# Patient Record
Sex: FEMALE | Race: WHITE
[De-identification: names, ages, dates, MRNs, and addresses within clinical notes are randomized per-mention and may not be internally consistent; named-entity substitution may affect disease eponyms.]

---

## 2018-02-13 ENCOUNTER — HOSPITAL ENCOUNTER (OUTPATIENT)
Dept: HOSPITAL 46 - DS | Age: 63
Discharge: HOME | End: 2018-02-13
Attending: SURGERY
Payer: COMMERCIAL

## 2018-02-13 VITALS — BODY MASS INDEX: 36.07 KG/M2 | HEIGHT: 62 IN | WEIGHT: 195.99 LBS

## 2018-02-13 DIAGNOSIS — E78.00: ICD-10-CM

## 2018-02-13 DIAGNOSIS — Z79.82: ICD-10-CM

## 2018-02-13 DIAGNOSIS — E11.9: ICD-10-CM

## 2018-02-13 DIAGNOSIS — C77.3: ICD-10-CM

## 2018-02-13 DIAGNOSIS — Z88.5: ICD-10-CM

## 2018-02-13 DIAGNOSIS — C50.112: ICD-10-CM

## 2018-02-13 DIAGNOSIS — Z98.890: ICD-10-CM

## 2018-02-13 DIAGNOSIS — E05.90: ICD-10-CM

## 2018-02-13 DIAGNOSIS — E78.5: ICD-10-CM

## 2018-02-13 DIAGNOSIS — C50.512: Primary | ICD-10-CM

## 2018-02-13 DIAGNOSIS — Z79.899: ICD-10-CM

## 2018-02-13 DIAGNOSIS — Z17.0: ICD-10-CM

## 2018-02-13 DIAGNOSIS — Z90.49: ICD-10-CM

## 2018-02-13 DIAGNOSIS — Z91.018: ICD-10-CM

## 2018-02-13 PROCEDURE — A9541 TC99M SULFUR COLLOID: HCPCS

## 2018-02-13 PROCEDURE — 0HBU0ZZ EXCISION OF LEFT BREAST, OPEN APPROACH: ICD-10-PCS | Performed by: SURGERY

## 2018-02-13 PROCEDURE — 07B60ZX EXCISION OF LEFT AXILLARY LYMPHATIC, OPEN APPROACH, DIAGNOSTIC: ICD-10-PCS | Performed by: SURGERY

## 2018-02-13 NOTE — NUR
PT IS BACK TO DS FROM PACU. PT IS AWAKE AND ALERT, FAMILY IS AT THE BEDSIDE.
PT HAS WATER ON THE BEDSIDE TABLE. SHE HAS NO C/O'S PAIN OR NAUSEA AT THIS
TIME. CALL LIGHT WITHIN REACH. NO OTHER C/O'S AT THIS TIME. WILL REASSES
WITHIN THE HOUR.

## 2018-02-13 NOTE — NUR
PT AND FAMILY EDUCATED ON HOW TO CARE FOR BRIAN DRAIN. DC INSTRUCTIONS VERBALLY
GIVEN, QUESTIONS ASKED THROUGHOUT THE DC PROCESS.

## 2018-02-13 NOTE — NUR
PT REPORTS HER PAIN IS IMPROVING AFTER PAIN MEDICATION. SHE WOULD LIKE TO GET
UP AND GO TO THE BATHROOM.

## 2018-02-13 NOTE — NUR
PT RETURNS FROM IMAGING AND IV IS RECONNECTED. FAMILY REMAINS @ BS. PT
AMBULATES TO THE BR AND IS BACK IN BED. PT DENIES ADD'L NEEDS.

## 2018-02-14 NOTE — OR
[facility]
                                    [address], [place]  [address]
_________________________________________________________________________________________
                                                                 Signed   
 
 
DATE OF OPERATION:
02/13/2018
 
SURGEON:
Devin Cherry MD
 
PREOPERATIVE DIAGNOSES:
1. Left central upper infiltrating ductal breast carcinoma.  ER positive, MD negative,
HER-2/jacque pending. 
2. Poor radionuclide uptake to axilla on sentinel lymph node identification.
 
POSTOPERATIVE DIAGNOSES:
1. Left central upper infiltrating ductal breast carcinoma.  ER positive, MD negative,
HER-2/jacque pending. 
2. Poor radionuclide uptake to axilla on sentinel lymph node identification.
3. Uptake of methylene blue dye to axillary lymph node suspicious for metastatic disease.
 
PROCEDURES:
1. Injection of methylene blue dye for sentinel lymph node identification.
2. Left axillary sentinel lymph node biopsy.
3. Left axillary lymph node dissection with placement of drain.
4. Excision of left breast carcinoma, central upper breast with additional resection of
suspicious tissue in the left lower lateral aspect. 
5. Oncoplastic closure with mobilization of breast parenchyma to close defect and
excision of skin for cosmetic benefit. 
 
ANESTHESIA:
General endotracheal, Yung Gunderson CRNA.
 
INDICATION:
This is a 62-year-old white woman, [facility] and identified as
having a palpable mass in the upper left breast a few cm from the areolar margin.
Image-guided biopsy by Dr. Loyd confirmed infiltrating ductal carcinoma, ER positive,
MD negative, HER-2/jacque pending.  The mass is easily palpable and despite a fair amount
of ecchymosis from her biopsy measured clinically about 2 cm, possibly larger.  There is
no other regional palpable mass bladder exam.  She does have morbid obesity.  The left
axilla is clinically negative. 
 
She is admitted at this time to undergo partial mastectomy, sentinel lymph node biopsy,
anticipating radiation therapy, as well as other indicated therapies depending on
pathologic findings. 
 
 
    Electronically Signed By: DEVIN CHERRY MD  02/14/18 1041
_________________________________________________________________________________________
PATIENT NAME:     AMARJIT RENEE                       
MEDICAL RECORD #: [id number]                     OPERATIVE REPORT              
          ACCT #: [id number]  
DATE OF BIRTH:   [birth year]                                       
PHYSICIAN:   DEVIN CHERRY MD                      REPORT #: [id number]
REPORT IS CONFIDENTIAL AND NOT TO BE RELEASED WITHOUT AUTHORIZATION
 
 
                                  [facility]
                                    [address], [place]  [address]
_________________________________________________________________________________________
                                                                 Signed   
 
 
She understands the risks of bleeding, infection, arm edema, need for additional therapy
including additional resection of breast tissue, additional axillary surgery or even
mastectomy depending on findings and wishes to proceed. 
 
FINDINGS:
Radionuclide identification of axillary lymph node was not accomplished though
attempted.  Good injection and uptake in the surrounding parenchyma of the breast were
noted, but no uptake to the axilla appreciably.  Methylene blue dye injected in the
operating room setting showed arborization of the lymphatics in the areolar area, but no
generalized arborization, but a left axillary lymph node was identified as having high
uptake of methylene blue dye and a relatively large palpable node.  Frozen pathology by
Dr. Silva and his colleagues showed the lymph node to be suspicious for metastatic
disease, but not certain.  Palpation of the axilla in addition to this frozen pathology
prompted complete axillary dissection as the lymph nodes were suspicious for metastatic
disease.  Long thoracic and thoracodorsal neurovascular bundles were identified and
preserved. 
 
As regard to the tumor itself, it was relatively large.  It was located a few
centimeters cephalad to the areolar margin.  A circumareolar incision was made and
meticulous care made to maintain a negative margin in all areas, which resulted in a
relatively large defect.  Parenchymal mobilization to close the defect was accomplished
for its oncoplastic purpose.  Additional skin was resected to this and as well.  The
resected skin was directly over the area of the tumor and was additionally sent as
specimen.  Of note, another completely different focus of very suspicious breast
parenchyma in the inferior lateral aspect was noted, which would make it essentially in
a different quadrant.  This was fully excised and we await final pathology to know it is
true identity, but I am very suspicious it may represent a separate focus of breast
cancer that was not identified on imaging studies or on clinical exam. 
 
DESCRIPTION OF PROCEDURE:
The patient was brought to the operating room having been received from the Radiology
suite for attempted localization with radionuclide.  I reviewed the films with Dr. Loyd and there was no obvious uptake in the axilla on imaging studies.  In the supine
position, she was given a general endotracheal anesthetic and preoperative antibiotic
Ancef as well as sequential compression device stockings and heparin subcutaneously
administered.  Using the C-Trak radionuclide probe, obvious strong signal was noted in
the circumareolar area, but no appreciable uptake in the axilla itself.  There was a
small amount in the parenchyma laterally, but not much.  Nevertheless, methylene blue
dye was injected in the left periareolar area in the upper outer aspect anticipating an
axillary incision and probable axillary dissection.  Palpation of the axilla did show
some suspicious palpable adenopathy high in the axilla, not previously known. 
 
 
    Electronically Signed By: DEVIN CHERRY MD  02/14/18 1041
_________________________________________________________________________________________
PATIENT NAME:     AMARJIT RENEE                       
MEDICAL RECORD #: [id number]                     OPERATIVE REPORT              
          ACCT #: [id number]  
DATE OF BIRTH:   [birth year]                                       
PHYSICIAN:   DEVIN CHERRY MD                      REPORT #: [id number]
REPORT IS CONFIDENTIAL AND NOT TO BE RELEASED WITHOUT AUTHORIZATION
 
 
                                  [address]  [address]
_________________________________________________________________________________________
                                                                 Signed   
 
 
The breast was prepared with chlorhexidine solution and draped sterilely.  An incision
was made transversely in the upper axilla.  Dissection carried through the dermis with
electrocautery and blunt dissection allowing for interrogation of the fatty left
axillary contents.  Palpation deep within the axilla did show several suspicious large
very firm lymph nodes.  Dissection on the medial wall of the cavity showed a palpable
lymph node that had good uptake of methylene blue dye.  This was dissected free and
considered sentinel lymph node.  Application of the C-Trak probe to it did not show
appreciable radioactivity, however.  Specimens considered a sentinel lymph node
nevertheless and examined by the pathologist, Dr. Tony Silva.  Though we could not say
definitively that there was metastatic disease within the lymph node, he was suspicious
and given the clinical circumstances I certainly highly suspicious that it did represent
metastatic disease.  On that basis, completion axillary dissection was considered
appropriate.  Using sharp dissection, the axillary vein was identified in the soft
tissue inferior to it, was dissected free away from the axillary vein.  Clips were
applied to small vessels as necessary and intercostal brachial vessels which could not
be avoided and their division and dissection also secured.  The long thoracic and
thoracodorsal neurovascular bundles were identified and preserved.  The axillary packet
was ultimately freed from a superior to inferior and medial direction excising a
generous packet of lymph nodes.  The tissue was  with a hemostat and another
small sentinel lymph node was identified and marked with a suture for further attention
on permanent examination.  The wound was irrigated with sterile water, showing no sign
of untoward bleeding.  The axilla was packed with a plain gauze and attention turned
towards the primary lesion. 
 
The palpable lesion was a few centimeters cephalad to the areolar margin.  The areolar
margin was marked carefully and incised a 1 cm margin from the palpable neoplasm
cephalad and its orientation was taken directly down to the essentially the chest wall
providing an inferior margin that was clinically quite clear.  Subsequently, a flap was
elevated cephalad directly over the tumor maintaining a clinically negative margin.
Dissection laterally and medially was then undertaken, excising a very generous segment
of breast tissue, certainly a partial mastectomy.  The specimen was marked with a single
stitch in the inferior margin.  A double stitch in the superficial margin and 3 silk
sutures laterally.  Notably in that area was a small nodule completely separate from the
index lesion, which was suspicious for either an intraparenchymal lymph node with
metastatic disease or perhaps a 2nd lesion.  Palpation within the resected cavity
laterally showed a very dense lesion.  This was inferior and lateral.  This was grasped
with an Allis clamp and excised with electrocautery with a wide margin and is very
suspicious for additional focus of malignancy.  This is not certain, however, and final
pathology is pending. 
 
At this point, the defect of the resected site was rather considerable.  Parenchymal
mobilization for a more oncoplastic closure was deemed advisable and readily possible.
 
    Electronically Signed By: DEVIN CHERRY MD  02/14/18 1041
_________________________________________________________________________________________
PATIENT NAME:     AMARJIT RENEE                       
MEDICAL RECORD #: [id number]                     OPERATIVE REPORT              
          ACCT #: [id number]  
DATE OF BIRTH:   [birth year]                                       
PHYSICIAN:   DEVIN CHERRY MD                      REPORT #: [id number]
REPORT IS CONFIDENTIAL AND NOT TO BE RELEASED WITHOUT AUTHORIZATION
 
 
                                  [address]  [address]
_________________________________________________________________________________________
                                                                 Signed   
 
 
A flap was elevated cephalad freeing the overlying skin and subcutaneous tissue from the
underlying breast parenchyma.  Mobilization of this pedicle superiorly to the inferior
pedicle beneath the nipple was undertaken and secured with interrupted 2-0 Vicryl
suture.  This allowed for good reapproximation of parenchymal remnants.  The deep dermis
was reapproximated with interrupted 2-0 Vicryl and redundant dermis was quite obvious
cephalad and resected.  That resected skin would correspond to being directly over the
neoplasm that has previously been resected.  Additional reapproximation of dermis
throughout the length of the incision closed the wound down quite markedly.  Tethering
of the dermis to underlying parenchyma was still present to a small degree, but left in
situ, so as to not excessively mobilize the superior flap. 
 
The skin was then closed with interrupted 3-0 Vicryl and a running subcuticular 3-0
Vicryl ultimately performed.  Steri-Strips were applied as was a Mepilex silver sponge
dressing and an OpSite.  Reinspection of the axilla showed good hemostasis.  Irrigation
was undertaken showing no bleeding.  Through a separate stab incision, a 7 mm flat Shan
drain was placed and secured to the skin with nylon suture.  The wound was closed in
layers with interrupted 2-0 Vicryl and running subcuticular 3-0 Vicryl.  Steri-Strips
were applied as well as a Mepilex silver sponge dressing and an OpSite. 
 
The blood loss was approximately 50 mL in aggregate.  Most of it related to the breast
parenchymal dissection.  The operation was somewhat prolonged, complicated, and
difficult on the basis of need for oncoplastic closure technique. 
 
 
 
            ________________________________________
            MD MI Queen/EMILY
Job #:  [id number]
DD:  02/13/2018 14:25:35
DT:  02/13/2018 22:24:17
 
cc:            LAUREN Miller MD
 
 
 
 
 
 
    Electronically Signed By: DEVIN CHERRY MD  02/14/18 1041
_________________________________________________________________________________________
PATIENT NAME:     AMARJIT RENEE                       
MEDICAL RECORD #: [id number]                     OPERATIVE REPORT              
          ACCT #: [id number]  
DATE OF BIRTH:   [birth year]                                       
PHYSICIAN:   DEVIN CHERRY MD                      REPORT #: [id number]
REPORT IS CONFIDENTIAL AND NOT TO BE RELEASED WITHOUT AUTHORIZATION

## 2018-02-22 ENCOUNTER — HOSPITAL ENCOUNTER (INPATIENT)
Dept: HOSPITAL 46 - DS | Age: 63
LOS: 1 days | Discharge: HOME | DRG: 582 | End: 2018-02-23
Attending: SURGERY | Admitting: SURGERY
Payer: COMMERCIAL

## 2018-02-22 VITALS — HEIGHT: 62 IN | WEIGHT: 195.99 LBS | BODY MASS INDEX: 36.07 KG/M2

## 2018-02-22 DIAGNOSIS — C50.812: Primary | ICD-10-CM

## 2018-02-22 DIAGNOSIS — C77.3: ICD-10-CM

## 2018-02-22 DIAGNOSIS — Z17.1: ICD-10-CM

## 2018-02-22 PROCEDURE — 0KXG0Z8 TRANSFER LEFT TRUNK MUSCLE, SUPERFICIAL INFERIOR EPIGASTRIC ARTERY FLAP, OPEN APPROACH: ICD-10-PCS | Performed by: SURGERY

## 2018-02-22 PROCEDURE — 0HTU0ZZ RESECTION OF LEFT BREAST, OPEN APPROACH: ICD-10-PCS | Performed by: SURGERY

## 2018-02-22 NOTE — NUR
STEADY ON FEET, UP TO BATHROOM WITH STANDBY ASSIST, HAS 2 BRIAN DRAINS WITH LARGE
CLOTS PRESENT IN THE BULBS. DRESSING IS C/D/I, LEFT ARM RESTRICTED EXTREMETY.
PAIN CONTROLLED WITH DILAUDID. EATING AND DRINKING WELL.

## 2018-02-22 NOTE — NUR
TO ROOM 107 FROM PACU, BROUGHT ON STRETCHER WITH PACU RN. REPORT RECIEVED FROM
CHRISTIAN RN PACU NURSE. FAMILY WITH PATIENT. PATIENT ALERT AND ORIENTED AND
INTERACTING. ICE CHIPS PROVIDED.

## 2018-02-22 NOTE — NUR
BRIAN DRAINS STRIPPED AND EMPTIED. SEROSANGUINESS FLUID PRESENT. ALSO LARGE CLOT
PRESENT IN BRIAN DRAIN #2. TAKES UP ABOUT 1/3 OF THE DRAIN. AND BRIAN #1 HAS A
SMALLER CLOT IN IT. DR CHERRY STATES NOT TO WORRY ABOUT THE CLOTS AND TO JUST
CONTINUE STRIPPING THE DRAINS WHEN EMPTYING THEM.
DR CHERRY IN TO SEE PATIENT.

## 2018-02-22 NOTE — NUR
PATIENTS PAIN LEVEL AT A 5/10 IN HER LEFT CHEST DUE TO SURGERY, PATIENT GIVEN
0.5MG OF DILAUDID IV AT THIS TIME.  IV ANCEF HUNG AND RUNNING.  PATIENT 1
PERSON ASSIST UP TO THE BATHROOM TO VOID,  WAS FAIRLY STEADY ON HER FEET NO
C/O DIZZINESS.

## 2018-02-22 NOTE — NUR
Pt. IS RESTING IN BED WITH FRIENDS AT . INTRODUCED SELF TO HER AND WILL HELP
WITH CARE TOMORROW MORNING.

## 2018-02-22 NOTE — NUR
02/22/18 0959 Terri Boyle
7250-PATIENT ARRIVED TO PACU ON 6L MASK O2 % PATIENT REACTIVE
EYES SLIGHTLY OPEN GLUCOSE 95. INCISION CDI WITH 2 BRIAN DRAINS.

## 2018-02-22 NOTE — NUR
PATRICK FROM [facility] REPORTS THAT SHE CAME TO CONSULT. SOME EQUIPMENT
PROVIDED TO HER AND DISCUSSED CASE WITH PATIENT. ALSO, PATRICK STATES WILL BE
COMING TOMORROW TO SEE PATIENT AGAIN.

## 2018-02-22 NOTE — NUR
ROUNDED AS CHARGE. ASSISTED PATIENT TO THE RESTROOM AS A SBA. PATIENT WAS ABLE
TO VOID. PATIENT IS NOW BACK IN BED RESTING. ICE WATER REFILLED PER REQUEST.
PATIENT DENIES ANY PAIN. NO FURTHER NEEDS NOTED. CALL LIGHT IN REACH.

## 2018-02-23 NOTE — NUR
MEDICATED WTIH DILAUDID 4MG, SECOND DOSE GIVEN AS PT STATED NO PAIN RELIEF
FROM PAIN MED GIVEN EARLIER

## 2018-02-23 NOTE — NUR
RECIEVED BEDSIDE REPORT FROM VADIM CARNES.  PT AWAKE AND ALERT IN BED.  PT STATES
PAIN IS WELL CONTROLED AT THIS TIME.  PT DENIES NEEDS AT THIS TIME.  PERSONAL
BELONGINGS IN REACH.

## 2018-02-23 NOTE — NUR
Up to brp with one assist, passing gas, no bm, voided clear urine. bacak to
bed. c/o left breast area pain, medicated, coop with assessment

## 2018-02-23 NOTE — NUR
PT DECLINED SHOWER SINCE SHE IS GOING HOME.  SHE WILL SHOWER AT HOME.  PT
VERBALIZED UNDERSTANDING OF DISCHARGE INSTRUCTIONS, HANDOUT GIVEN.  ALL
QUESTIONS ANSWERED.

## 2018-02-23 NOTE — NUR
PT IN BED, VAPOTHERN IN PLACE 20L N/C, 70% . CONT PULSE OX IN PLACE 92%. PT UP
TO BSC, SATS DROPPED TO 80%, VAPOTHERN IN PLACE, CDB ENCOURAGED, BACK TO BED
AFTER VOIDING. AFTER A FEW MINUTES SATS WENT UP TO 86%, THEN 88%. MORE CDB
ENCOURAGED, HOB ELEVATED, SATS 89-90%. SOB WITH EXERTION PRESENT

## 2018-02-23 NOTE — NUR
ORDERED PATIENT'S BREAKFAST AND ALSO SET HER UP FOR A SHOWER SOMETIME TODAY.
WILL BRUSH HER TEETH AFTER BREAKFAST.

## 2018-02-23 NOTE — NUR
Pt has slept this shift, was medicated wtih Dilaudid 0.5mg iv x1 and with
Dilaudid po 4mg x2 with good relief after second 4mg. adn w zofran x1 per
 c/o n/v, no emesis presetn. Dressing left breast/chest area intact. 2 BRIAN with
sanguineous drainage, patent. Pt gets up to brp with minimum of assist

## 2018-03-08 NOTE — OR
[facility]
                                    [address], [place]  [address]
_________________________________________________________________________________________
                                                                 Signed   
 
 
DATE OF OPERATION:
02/22/2018
 
SURGEON:
Devin Cherry MD
 
PREOPERATIVE DIAGNOSIS:
1. Left multicentric infiltrating ductal breast carcinoma, stage III.
2. Recent partial mastectomy with sentinel lymph node biopsy and completion axillary
dissection. 
 
POSTOPERATIVE DIAGNOSES:
1. Left multicentric infiltrating ductal breast carcinoma, stage III.
2. Recent partial mastectomy with sentinel lymph node biopsy and completion axillary
dissection. 
 
PROCEDURE:
1. Left total mastectomy.
2. Left T plasty of axillary soft tissue.
 
ANESTHESIA:
General endotracheal.
 
ANESTHESIOLOGIST:
Yung Gunderson CRNA.
 
INDICATION:
This 62-year-old white woman is a patient of Vance Adler PA-C and recently underwent a
partial mastectomy by me for a tumor located in the upper central aspect, which was an
infiltrating ductal carcinoma, ER/WI negative, HER-2/jacque negative.  At the time of wide
resection of the lesion, she was found to have a satellite lesion in a different
quadrant of the breast in the lower outer aspect.  The margin on that additionally
resected tissue was positive.  Her sentinel lymph node biopsy was positive at that time
and she underwent completion axillary dissection with multiple positive axillary lymph
nodes. 
 
Consideration has been made for further management and total mastectomy was recommended.
 Given the findings of additional focus of breast cancer, considered multicentric given
its location and different quadrant of the breast.  She is rather obese, although an
oncoplastic closure has been accomplished with a very good cosmetic result.  Mastectomy
is recommended and consideration for delayed reconstruction would be made in the future
as well. 
 
    Electronically Signed By: DEVIN HCERRY MD  03/08/18 1411
_________________________________________________________________________________________
PATIENT NAME:     AMARJIT RENEE                       
MEDICAL RECORD #: [id number]            OPERATIVE REPORT              
          ACCT #: [id number]  
DATE OF BIRTH:   [birth year]            REPORT #: [id number]      
PHYSICIAN:        DEVIN CHERRY MD                 
PCP:              VANCE ADLER PAC               
REPORT IS CONFIDENTIAL AND NOT TO BE RELEASED WITHOUT AUTHORIZATION
 
 
                                  [facility]
                                    [address] [place] [address]  [address]
_________________________________________________________________________________________
                                                                 Signed   
 
 
 
The patient and her  and daughters understand the risks of bleeding, infection,
cosmetic deformity, arm numbness, lymphedema, and other unforeseen complications and
wished to proceed.  Additionally, it is clear that she will need adjuvant therapy
including chemotherapy and possibly even radiation therapy depending on circumstances.
Understands that she wished to proceed. 
 
FINDINGS:
The left breast and a large excision related to partial mastectomy was healing well.
The left axillary drain remained in place and it was down to about 28 mL a day.  A
tattoo in the superior aspect of the breast was preserved during the course of the
resection.  A total mastectomy was accomplished.  There was a very cosmetically
unattractive lateral aspect of the posterior axillary fold as it is typical of patients
with significant obesity.  This was addressed by a T plasty including excision of soft
tissue, skin, and so forth.  Much improved cosmetic result was noted on the basis of
that closure. 
 
DESCRIPTION OF PROCEDURE:
The patient was brought to the operating room, given a general endotracheal anesthetic.
Preoperative antibiotic Ancef was given.  Sequential compression device stockings used
and heparin subcutaneously administered.  The left breast and axilla were examined.
Steri-Strips were removed from the recent operation.  The drain was left in situ and
prepped in with chlorhexidine solution.  An elliptical incision was made incorporating
the nipple-areolar complex in the previous circumareolar incision.  The superior based
tattoo was left in place.  Superior and inferior flaps were developed with sharp
dissection using a 20-blade.  The breast was excised from medial and superior aspect
inferiorly and laterally excising the breast completely including the fascia attached to
the pectoralis.  In the region of the axilla, the axillary area was entered.  The
gelatinous appearing smooth axillary contents were noted and the drain noted as well.
There was no sign of problem in this area.  The drain was transected at the skin level
and removed, however, anticipating placement of another drain. 
 
Hemostasis was assured with electrocautery as only sharp dissection was used for the
flaps to minimize flap compromise.  It was clear that the lateral aspect of the
posterior axillary fold and its significant amount of adipose tissue would need to be
managed carefully to improve cosmetic effect in relation to mastectomy. 
 
A partial reapproximation of the flaps with a 2-0 Vicryl was used and a T plasty was
deemed most advisable to provide for cosmetic benefit.  The T flaps were developed
superiorly and inferiorly, excising a generous portion of skin and subcutaneous tissue
inferiorly, superiorly, and laterally as usual.  The wound edges were closed with
interrupted 2-0 Vicryl throughout and a running subcuticular 3-0 Vicryl used for the
 
    Electronically Signed By: DEVIN CHERRY MD  03/08/18 1411
_________________________________________________________________________________________
PATIENT NAME:     AMARJIT RENEE                       
MEDICAL RECORD #: [id number]            OPERATIVE REPORT              
          ACCT #: [id number]  
DATE OF BIRTH:   [birth year]            REPORT #: [id number]      
PHYSICIAN:        DEVIN CHERRY MD                 
PCP:              VANCE ADLER PAC               
REPORT IS CONFIDENTIAL AND NOT TO BE RELEASED WITHOUT AUTHORIZATION
 
 
                                  [address]  [address]
_________________________________________________________________________________________
                                                                 Signed   
 
 
skin.  Steri-Strips were applied.  The cosmetic result is quite good, particularly in
comparison to having left the posterior axillary flap in situ.  Flap viability was
excellent.  Two drains have been placed, one in the sub flap.  The other in the axillary
area.  Not mentioned previously was irrigation of the entire wound cavity with sterile
water, 2 L in total for its tumor-lytic effect.  Steri-Strips were applied as was a
Mepilex silver sponge dressing.  The drains had been attached to the skin with nylon 
suture and attached to bulb suction.  Blood loss was about 100 mL in aggregate.  Sponge,
needle, and instrument counts reported as correct x3. 
 
 
 
            ________________________________________
            MD MI Queen/SKYLARL
Job #:  [id number]
DD:  02/22/2018 10:18:28
DT:  02/22/2018 15:43:42
 
cc:            MD Vance Grajeda PA-C Robert C Quackenbush, MD
 
 
Copies:  YUSUF WADDELL MD, ERIKA PAC QUACKENBUSH, ROBERT C MD
~
 
 
 
 
 
 
 
 
 
 
 
 
    Electronically Signed By: DEVIN CHERRY MD  03/08/18 1411
_________________________________________________________________________________________
PATIENT NAME:     AMARJIT RENEE                       
MEDICAL RECORD #: [id number]            OPERATIVE REPORT              
          ACCT #: [id number]  
DATE OF BIRTH:   [birth year]            REPORT #: [id number]      
PHYSICIAN:        DEVIN CHERRY MD                 
PCP:              VANCE ADLER               
REPORT IS CONFIDENTIAL AND NOT TO BE RELEASED WITHOUT AUTHORIZATION

## 2018-03-20 ENCOUNTER — HOSPITAL ENCOUNTER (OUTPATIENT)
Dept: HOSPITAL 46 - DS | Age: 63
Discharge: HOME | End: 2018-03-20
Attending: SURGERY
Payer: COMMERCIAL

## 2018-03-20 VITALS — BODY MASS INDEX: 33.83 KG/M2 | HEIGHT: 62 IN | WEIGHT: 183.82 LBS

## 2018-03-20 DIAGNOSIS — Z98.890: ICD-10-CM

## 2018-03-20 DIAGNOSIS — E05.90: ICD-10-CM

## 2018-03-20 DIAGNOSIS — E89.0: ICD-10-CM

## 2018-03-20 DIAGNOSIS — Z17.0: ICD-10-CM

## 2018-03-20 DIAGNOSIS — Z79.82: ICD-10-CM

## 2018-03-20 DIAGNOSIS — E78.5: ICD-10-CM

## 2018-03-20 DIAGNOSIS — Z90.12: ICD-10-CM

## 2018-03-20 DIAGNOSIS — I96: ICD-10-CM

## 2018-03-20 DIAGNOSIS — I10: ICD-10-CM

## 2018-03-20 DIAGNOSIS — Z79.4: ICD-10-CM

## 2018-03-20 DIAGNOSIS — Z79.899: ICD-10-CM

## 2018-03-20 DIAGNOSIS — E11.9: ICD-10-CM

## 2018-03-20 DIAGNOSIS — Z88.5: ICD-10-CM

## 2018-03-20 DIAGNOSIS — L76.34: Primary | ICD-10-CM

## 2018-03-20 DIAGNOSIS — C50.112: ICD-10-CM

## 2018-03-20 PROCEDURE — 0H9U3ZZ DRAINAGE OF LEFT BREAST, PERCUTANEOUS APPROACH: ICD-10-PCS | Performed by: SURGERY

## 2018-03-20 PROCEDURE — 0JB60ZZ EXCISION OF CHEST SUBCUTANEOUS TISSUE AND FASCIA, OPEN APPROACH: ICD-10-PCS | Performed by: SURGERY

## 2018-03-20 NOTE — NUR
DC INSTRUCTIONS GIVEN IN PRESENCE OF SPOUSE. BOTH VERBALIZE UNDERSTANDING. PT
DRESSING IN PRESENCE OF SPOUSE.

## 2018-03-20 NOTE — OR
[facility]
                                    [address] [place], [place]  [address]
_________________________________________________________________________________________
                                                                 Signed   
 
 
DATE OF OPERATION:
03/20/2018
 
SURGEON:
Devin Cherry MD
 
PREOPERATIVE DIAGNOSIS:
Stage 3 breast cancer, left side, status post left total mastectomy with axillary
dissection (February 22, 2018). 
 
POSTOPERATIVE DIAGNOSES:
1. Necrosis of T plasty lateral aspect of the incision with seroma formation medially.
2. Chronic cough.
 
PROCEDURES:
1. Exam under anesthesia and debridement of skin, subcutaneous tissue and eschar,
lateral mastectomy wound site. 
2. Aspiration of medial sub flap seroma.
3. Application of wound VAC device (SNAP device).
 
ANESTHESIA:
General endotracheal (Joseline Adkins CRNA).
 
INDICATION:
This 62-year-old white woman is a patient of Vance Adler and Dr. Sunita Loyd.  She
underwent lumpectomy with sentinel lymph node biopsy and axillary dissection on February
13, 2018.  She was found to have more than one lesion within the breast and in a
different quadrant.  Her axillary dissection at the time of her initial operation showed
4/12 regional lymph nodes positive for metastatic disease.  She returned the operation
on February 22, 2018, for a total mastectomy.  Due to her obesity, a T plasty was
undertaken of the lateral axillary fat tissue.  Although initially healing well, she
then developed some flap necrosis in the inferior aspect of the T plasty and recently
has developed a bit of a seroma medially.  Both drains had been removed.  A plan was
made today for Port-A-Cath placement, anticipating chemotherapy in the near future.
However, given the erythema and inflammation and so forth related to the flap necrosis,
the Port-A-Cath was delayed and debridement of the wound is deemed more appropriate at
this time.  She understands as does her , the risks of bleeding, infection, need
for additional treatment and so forth and wished to proceed with wound debridement as
necessary. 
 
FINDINGS:
There is a seroma in the medial sub-flap area, which was aspirated.  This fluid was sent
 
    Electronically Signed By: DEVIN CHERRY MD  03/20/18 1358
_________________________________________________________________________________________
PATIENT NAME:     AMARJIT RENEE                       
MEDICAL RECORD #: [id number]            OPERATIVE REPORT              
          ACCT #: [id number]  
DATE OF BIRTH:   [birth year]            REPORT #: [id number]      
PHYSICIAN:        DEVIN CHERRY MD                 
PCP:              VANCE ADLER PAC               
REPORT IS CONFIDENTIAL AND NOT TO BE RELEASED WITHOUT AUTHORIZATION
 
 
                                  [facility]
                                    [address] [place], [place]  [address]
_________________________________________________________________________________________
                                                                 Signed   
 
 
for Gram stain and culture, though it did not appear infected nor was it foul smelling.
The dense eschar laterally in the T plasty was mostly in the inferior limb of the T
plasty and was debrided fully.  Some necrotic fat was noted as well.  Debridement was
taken back to viable tissue.  There was granulation of the sub-flap area more medially.
Ultimately, a SNAP device (wound VAC device) was applied to the area in question, which
held a good seal and appeared to be optimal for ongoing wound management. 
 
The patient does have a chronic cough, though chest x-ray recently performed showed no
sign of infiltrate or pneumonia.  Notably, she has been on antibiotic Bactrim recently. 
 
DESCRIPTION OF PROCEDURE:
The patient was brought to the operating room, given a general endotracheal anesthetic.
Preoperative antibiotic Ancef was given.  Sequential compression device stockings were
used.  The left chest wall was prepared with a chlorhexidine solution and draped
sterilely.  An aspiration of the medial seroma in the left breast was undertaken,
draining somewhat copper appearing fluid.  This was later sent for Gram stain and
culture.  Complete decompression was undertaken.  Laterally, sharp dissection was
undertaken of a dense eschar, which was excised and debrided fully through some necrotic
fat.  At the base of the wound, a good granulation was noted.  Whether or not the
seromatous cavity and the lateral aspect of the incision were connected.  It is
uncertain though a small connection is likely as milking of the seroma area did allow
for egress of seromatous fluid out the lateral aspect.  Once complete debridement was
undertaken, which included skin, subcutaneous tissue, and eschar, a SNAP device was cut
to appropriate configuration and secured.  The SNAP device allowed for good suction and
the seal maintained.  An OpSite had been applied over the area as well.  The patient was
then extubated and transferred to recovery room in good condition. 
 
 
 
            ________________________________________
            MD MI Queen/MODL
Job #:  [id number]
DD:  03/20/2018 08:47:38
DT:  03/20/2018 10:36:13
 
cc:            MD Vance Sinclair PA-C
 
 
    Electronically Signed By: DEVIN CHERRY MD  03/20/18 1358
_________________________________________________________________________________________
PATIENT NAME:     AMARJIT RENEE                       
MEDICAL RECORD #: [id number]            OPERATIVE REPORT              
          ACCT #: [id number]  
DATE OF BIRTH:   [birth year]            REPORT #: [id number]      
PHYSICIAN:        DEVIN CHERRY MD                 
PCP:              VANCE ADLER PAC               
REPORT IS CONFIDENTIAL AND NOT TO BE RELEASED WITHOUT AUTHORIZATION
 
 
                                  [address]  [address]
_________________________________________________________________________________________
                                                                 Signed   
 
 
Copies:  ABBEY LIU MD, ERIKA PAC
~
 
 
 
 
 
 
 
 
 
 
 
 
 
 
 
 
 
 
 
 
 
 
 
 
 
 
 
 
 
 
 
 
 
 
 
 
 
 
 
 
    Electronically Signed By: DEVIN CHERRY MD  03/20/18 1358
_________________________________________________________________________________________
PATIENT NAME:     AMARJIT RENEE ANN                       
MEDICAL RECORD #: [id number]            OPERATIVE REPORT              
          ACCT #: [id number]  
DATE OF BIRTH:   [birth year]            REPORT #: [id number]      
PHYSICIAN:        DEVIN CHERRY MD                 
PCP:              VANCE ADLER               
REPORT IS CONFIDENTIAL AND NOT TO BE RELEASED WITHOUT AUTHORIZATION

## 2018-03-20 NOTE — NUR
03/20/18 0831 Anabelle Hugo
0826 PATIENT ARRIVES TO PACU UNRESPONSIVE TO PAIN OR VERBAL STIMULI.
RESP EVEN AND UNLABORED, MASK AT 6 LITERS. SNAP WOUND VAC TO LEFT
CHEST.
0830 PATIENT SPONTANEOUSLY OPENS EYES, ORIENTED TO PACU, DENIES PAIN,
THEN BACK TO SLEEP. MASK OFF.

## 2018-03-20 NOTE — NUR
ICED WATER AND JELLO GIVEN. SPOUSE @ BS. PATIENT STANDS AND AMBULATES TO THE
BR AND DOES THAT WELL. PT VOIDS 600 ML CLEAR, LIGHT YELLOW URINE AND IS BACK
IN BED. SCDS IN PLACE. CALL LIGHT W/IN REACH.

## 2018-05-01 ENCOUNTER — HOSPITAL ENCOUNTER (OUTPATIENT)
Dept: HOSPITAL 46 - DS | Age: 63
Discharge: HOME | End: 2018-05-01
Attending: SURGERY
Payer: COMMERCIAL

## 2018-05-01 VITALS — WEIGHT: 183.82 LBS | BODY MASS INDEX: 33.83 KG/M2 | HEIGHT: 62 IN

## 2018-05-01 DIAGNOSIS — K21.9: ICD-10-CM

## 2018-05-01 DIAGNOSIS — Z79.82: ICD-10-CM

## 2018-05-01 DIAGNOSIS — Z88.5: ICD-10-CM

## 2018-05-01 DIAGNOSIS — E66.9: ICD-10-CM

## 2018-05-01 DIAGNOSIS — Z90.12: ICD-10-CM

## 2018-05-01 DIAGNOSIS — Z79.4: ICD-10-CM

## 2018-05-01 DIAGNOSIS — Z79.899: ICD-10-CM

## 2018-05-01 DIAGNOSIS — E05.90: ICD-10-CM

## 2018-05-01 DIAGNOSIS — I10: ICD-10-CM

## 2018-05-01 DIAGNOSIS — E78.5: ICD-10-CM

## 2018-05-01 DIAGNOSIS — E11.9: ICD-10-CM

## 2018-05-01 DIAGNOSIS — C50.912: Primary | ICD-10-CM

## 2018-05-01 PROCEDURE — 0JH60WZ INSERTION OF TOTALLY IMPLANTABLE VASCULAR ACCESS DEVICE INTO CHEST SUBCUTANEOUS TISSUE AND FASCIA, OPEN APPROACH: ICD-10-PCS | Performed by: SURGERY

## 2018-05-01 PROCEDURE — 05HM33Z INSERTION OF INFUSION DEVICE INTO RIGHT INTERNAL JUGULAR VEIN, PERCUTANEOUS APPROACH: ICD-10-PCS | Performed by: SURGERY

## 2018-05-01 PROCEDURE — C1788 PORT, INDWELLING, IMP: HCPCS

## 2018-05-01 PROCEDURE — B513YZA FLUOROSCOPY OF RIGHT JUGULAR VEINS USING OTHER CONTRAST, GUIDANCE: ICD-10-PCS | Performed by: SURGERY

## 2018-05-01 NOTE — NUR
PT UP TO BR W/RN STANDBY. PT VOIDS UNMEASURED AMOUNT AND FEELS HER BLADDER IS
EMPTY. PT AMBULATES BACK TO HER BED AND SCDS ARE IN PLACE. CALL LIGHT W/IN
REACH.

## 2018-05-01 NOTE — OR
[facility]
                                    [address] [place], [place]  [address]
_________________________________________________________________________________________
                                                                 Signed   
 
 
DATE OF OPERATION:
05/01/2018
 
SURGEON:
Devin Cherry MD
 
PREOPERATIVE DIAGNOSES:
1. Left stage III breast carcinoma.
2. Obesity.
 
POSTOPERATIVE DIAGNOSES:
1. Left stage III breast carcinoma.
2. Obesity.
 
PROCEDURES:
1. Right internal jugular Bard port catheter placement.
2. Surgeon-directed fluoroscopy.
3. Completion angiogram of Port-A-Cath device.
 
ANESTHESIA:
General LMA; Devin Bocanegra CRNA and local 10 mL of 1% lidocaine.
 
INDICATION:
A 62-year-old white woman is a patient of Vance Douglas and has undergone left mastectomy
with axillary dissection for stage III breast carcinoma.  She did have some wound
healing issues in the lateral aspect of her mastectomy site, where a T-plasty was
undertaken given her obesity.  Notably, she does have underlying diabetes.  She has
essentially healed that problem up and is now to undergo Port-A-Cath placement,
anticipating chemotherapy under the direction of medical oncologist, Dr. Hartley. 
 
The risks of bleeding, infection, need for other indicated procedures, and other
unforeseen complications were reviewed in detail with her and her .  They
understand and wished to proceed. 
 
FINDINGS:
Dark nonpulsatile blood was noted from the right internal jugular vein.  Catheter was
placed without complication or problem.  Good function of the catheter was noted.  The
port was placed in the left medial pectoral area.  A completion angiogram of the device
showed a gentle curve on it without sign of kink or other problem.  The tip of the
catheter is in the superior vena cava.  Additionally noted on the postprocedure chest
x-ray in recovery room, no sign of pneumothorax or other problem. 
 
 
    Electronically Signed By: DEVIN CHERRY MD  05/01/18 1828
_________________________________________________________________________________________
PATIENT NAME:     AMARJIT RENEE                       
MEDICAL RECORD #: [id number]            OPERATIVE REPORT              
          ACCT #: [id number]  
DATE OF BIRTH:   [birth year]            REPORT #: [id number]      
PHYSICIAN:        DEVIN CHERRY MD                 
PCP:              VANCE DOUGLAS PAC               
REPORT IS CONFIDENTIAL AND NOT TO BE RELEASED WITHOUT AUTHORIZATION
 
 
                                  [facility]
                                    [address] [place], [place]  [address]
_________________________________________________________________________________________
                                                                 Signed   
 
 
DESCRIPTION OF PROCEDURE:
The patient was brought to the operating room, given a general LMA-type anesthetic.
Preoperative antibiotic Ancef was given.  Sequential compression device stockings were
used.  In a mild Trendelenburg position, the right neck and torso were prepared with a
chlorhexidine solution and draped sterilely.  With the head turned towards the left
using the Seldinger technique, the right internal jugular vein was easily accessed
showing dark nonpulsatile blood.  A flexible J-wire was passed down the wire and the
wire was removed.  Fluoroscopy was used to confirm the wire in the right heart system. 
 
Given her obesity, the port was deemed most optimally placed in the medial right
pectoral area.  Local anesthetic was injected transversely over about the 2nd rib area
and a transverse incision was made and dissection carried through the subcutaneous
tissue.  A relatively large mammary vein was encountered and this was ligated and
divided.  The pectoral fascia was ultimately encountered and blunt dissection was used
to create a pocket inferiorly on the incision site. 
 
The puncture site in the right neck was incised with an #11 blade and subsequently
dilator and peel-away sheath introducer passed over the wire.  The wire and dilator were
removed showing vigorous dark retrograde nonpulsatile bleeding. 
 
A previously inspected Bard port Groshong catheter, which had been irrigated with
heparin saline was passed down the sheath.  The peel-away sheath was removed without
problem showing complete removal.  Irrigation was undertaken and aspiration showed dark
nonpulsatile bleeding within the catheter. 
 
Under fluoroscopic control with the table placed in a neutral position, the tip of the
catheter was withdrawn to the atrial caval junction or so. 
 
Using the tunneling device, the tunnel was created in the subcutaneous space of the neck
over the clavicle into the pocket.  Care was taken to deliver the tunneling device flush
with the pectoralis fascia, so as to avoid an adverse angle where the catheter would
join the port. 
 
The port had been partially secured to the pectoralis fascia with interrupted 2-0 Vicryl
suture.  The catheter was cut to appropriate length and secured to the port per
's instructions using the enclosed collar device to secure it to the device.
 The Vicryl sutures were used to secure the device to the pectoralis fascia more fully.
Access to the device with an angled Norris needle showed easy withdrawal of blood and
easy flushing of heparinized saline. 
 
The port site was closed with interrupted 2-0 Vicryl and the port was accessed with an
angled Norris needle once again and a fluoroscopic angiogram performed showing a gentle
 
    Electronically Signed By: DEVIN CHERRY MD  05/01/18 1828
_________________________________________________________________________________________
PATIENT NAME:     AMARJIT REENE                       
MEDICAL RECORD #: [id number]            OPERATIVE REPORT              
          ACCT #: [id number]  
DATE OF BIRTH:   [birth year]            REPORT #: [id number]      
PHYSICIAN:        DEVIN CHERRY MD                 
PCP:              VANCE DOUGLAS PAC               
REPORT IS CONFIDENTIAL AND NOT TO BE RELEASED WITHOUT AUTHORIZATION
 
 
                                  [address]  [address]
_________________________________________________________________________________________
                                                                 Signed   
 
 
curve on the catheter with good function and the tip of the catheter was in the superior
vena cava. 
 
The port was once again flushed with heparinized saline.  The skin was closed with
running subcuticular 3-0 Vicryl, single interrupted 3-0 Vicryl was used for the puncture
site in the lateral right neck.  Steri-Strips were applied as was a Mepilex silver
sponge dressing and an OpSite.  The patient was ultimately extubated and transferred to
recovery room in good condition having suffered no complication.  Postprocedure chest
x-ray showed 
no complication of pneumothorax or other problem and good function of the catheter is
anticipated. 
 
 
 
            ________________________________________
            MD MI Queen/EMILY
Job #:  [id number]
DD:  05/01/2018 13:03:39
DT:  05/01/2018 15:00:19
 
cc:            LAUREN Miller MD Robert C Quackenbush, MD
 
 
Copies:  VANCE DOUGLAS CYNTHIA SUE MD QUACKENBUSH, ROBERT C MD
~
 
 
 
 
 
 
 
 
 
    Electronically Signed By: DEVIN CHERRY MD  05/01/18 1828
_________________________________________________________________________________________
PATIENT NAME:     AMARJIT RENEE                       
MEDICAL RECORD #: [id number]            OPERATIVE REPORT              
          ACCT #: [id number]  
DATE OF BIRTH:   [birth year]            REPORT #: [id number]      
PHYSICIAN:        DEVIN CHERRY MD                 
PCP:              DOUGLAS,VANCE PAC               
REPORT IS CONFIDENTIAL AND NOT TO BE RELEASED WITHOUT AUTHORIZATION

## 2018-05-01 NOTE — NUR
SOUP, WATER AND JUICE GIVEN. LEFT BREAST WOUND DRESSING IS NOT ADHERED AND IS
CHANGED AT THIS TIME. SCANT AMOUNT OF YELLOW/PINK DRAINAGE IS NOTED TO OLD
DRESSING. MEDIAL ASPECT OF SURGICAL INCISION REMAINS OPEN AND MEASURES
APPROXIMATELY 2 CM WIDE. ALLEVYN 3X3 DRESSING IS APPLIED. PT'S SPOUSE IS @
THIS BS. CALL LIGHT IS W/IN REACH.